# Patient Record
Sex: MALE | Race: WHITE | URBAN - METROPOLITAN AREA
[De-identification: names, ages, dates, MRNs, and addresses within clinical notes are randomized per-mention and may not be internally consistent; named-entity substitution may affect disease eponyms.]

---

## 2023-01-17 ENCOUNTER — HOSPITAL ENCOUNTER (OUTPATIENT)
Dept: PHYSICAL THERAPY | Age: 68
Discharge: HOME | End: 2023-01-17
Payer: COMMERCIAL

## 2023-01-17 PROCEDURE — 97162 PT EVAL MOD COMPLEX 30 MIN: CPT

## 2023-01-17 NOTE — PROGRESS NOTES
PHYSICAL THERAPY - DAILY TREATMENT NOTE    Patient Name: Jeni Dick        Date: 2023  : 1955   YES Patient  Verified  Visit #:      12  Insurance: Payor: Yesi Mom / Plan: [employer] / Product Type: HMO /      In time: 310 Out time: 401   Total Treatment Time: 51     Medicare/BCBS Time Tracking (below)   Total Timed Codes (min): 14 1:1 Treatment Time:  51     TREATMENT AREA =  Right shoulder pain [M25.511]  Left shoulder pain [M25.512]  Neck pain [M54.2]    SUBJECTIVE    Pain Level (on 0 to 10 scale):  0  / 10   Medication Changes/New allergies or changes in medical history, any new surgeries or procedures? NO    If yes, update Summary List   Subjective Functional Status/Changes:  []  No changes reported     See POC          OBJECTIVE  7 min Therapeutic Exercise:  [x]  See flow sheet   Rationale:      increase ROM, increase strength, improve coordination, improve balance, and increase proprioception to improve the patients ability to perform general ADLs with decrease c/o symptoms and with improved functional performance.      7 min Self Care: Reviewed diagnosis, prognosis, therapy progression  Reviewed and educated on HEP   Rationale:    Improve understanding of injury and therapy to have realistic expectation of therapy to improve compliance/adherence and satisfaction    Billed With/As:   [x] TE   [] TA   [] Neuro   [x] Self Care Patient Education: [x] Review HEP    [] Progressed/Changed HEP based on:   [] positioning   [] body mechanics   [] transfers   [] heat/ice application    [] other:        Other Objective/Functional Measures:    Shown and performed HEP     Post Treatment Pain Level (on 0 to 10) scale:   0 / 10     ASSESSMENT  Assessment/Changes in Function:     See POC     []  See Progress Note/Recertification   Patient will continue to benefit from skilled PT services to modify and progress therapeutic interventions, address functional mobility deficits, address ROM deficits, address strength deficits, analyze and address soft tissue restrictions, analyze and cue movement patterns, analyze and modify body mechanics/ergonomics, assess and modify postural abnormalities, and instruct in home and community integration to attain goals per POC.    Progress toward goals / Updated goals:    See POC     PLAN  [x]  Upgrade activities as tolerated YES Continue plan of care   []  Discharge due to :    [x]  Other: 1-2x per week for 6 weeks     Therapist: Mir Stovall PT    Date: 1/17/2023 Time: 8:47 AM     Future Appointments   Date Time Provider Nallely Vickers   1/31/2023 10:30 AM Krissy Hager, PT [facility]   2/2/2023 10:30 AM Krissy Hager, PT [facility]

## 2023-01-17 NOTE — THERAPY EVALUATION
[address] [place] [address] PHYSICAL THERAPY AT [address] [place] [address] [facility] [address] [address] [place] [address], [address]  Phone: [phone]  Fax: [phone] / [address] [address] PHYSICAL THERAPY SERVICES  Patient Name: Gauri Herbert : 1955   Medical   Diagnosis: Right shoulder pain [M25.511]  Left shoulder pain [M25.512]  Neck pain [M54.2] Treatment Diagnosis: Neck and B shoulder pain   Onset Date: Neck pain 4 years   B shoulder 3-4 months      Referral Source: Leavy Cheadle* Start of Care [facility]): 2023   Prior Hospitalization: See medical history Provider #: [id number]   Prior Level of Function: Some limited mobility in the neck due to aching and pain    Comorbidities:  L shoulder 25+ years (non surgical), R ACL reconstruction 30+ years ago. Medications: Verified on Patient Summary List   The Plan of Care and following information is based on the information from the initial evaluation.   ===========================================================================================  Assessment / moralez information:  Gauri Herbert is a 79 y.o. M who presents to skilled PT for the treatment diagnosis of neck and B shoulder pain. Denies any injury to the shoulder. Just years and years of wear and tear. Pt notes some pain with reaching out to the side with R worse than L. The position of the arm can make a difference in terms of the pain, can lift things close by the body with ease, and heavier things arm extended are more painful. Some pain with sleeping on the shoulders, more so on the R shoulder than the L shoulder. Denies injections. Had Xrays for the neck and shoulder on 23 showing nothing sig for the shoulder and some changes for the neck pt also notes some left  finger tingling since the onset of pain. tingling just on the pad of the R index finger. Denies N/T in the R shoulder.      As per the neck can feel a subtle ache in the neck with sometimes provoking a headache. Not a daily occurences. Gets chiropractics to address this. Pain is intermittent. Can go a week without issues in the neck. Had some testosterone shots which helped a lot with the neck pain. Pain:  Current: 0/10    Worst: 7/10 shoulder, 9/10 neck (can come with headaches)   Best: 0/10    ===========================================================================================    Objective:   FOTO score = 59 (an established functional score where 100 = no disability)    Posture: FHRS, slight elevated L clavicle     Functional Activity:  Functional ER - (L) T3   (R) T2 p! Functional IR - (L) T4    (R) T9 p! Shoulder AROM   L flexion 155 deg          R flexion 156 deg   L  deg    R  deg  L Ext 65 deg      R Ext 65 deg     Shoulder PROM   L flexion 155 deg          R flexion 145 deg   L  deg     R  deg   L ER 80 deg     R ER 80 deg   L IR 80 deg    R IR 80 deg     Shoulder RNT   All strong and pain free    Shoulder MMT   All 5/5     Cervical Screen:   Flexion 50 deg   Ext 38 deg tight CT junction   L rot 58 deg   R rot 65 deg   L SB 30 deg   R SB 38 deg     Upper cevical   L nod : DP  R nod: DN    Palpation: no sig TTP along shoulder    (+) empty can R shoulder pain worse in second position   (+) Hawkin's Mason  (+) Neer's test   (-) belly press test  (-) painful arc    Pt signs and symptoms align more with shoulder impingement on the R and cannot rule out possibility of RC tear due to continued night pain. Also notable neck stiffness without sig pain. Pt would benefit form skilled PT services addressing the current ROM, strength, functional performance, and balance impairments so that the patient to return to performing all ADLs with minimal restriction/limitation or without any functional limitations.      HEP provided to the patient in order to promote improvement and self management of symptoms and functional performance:   Access Code: [email or web address]  URL: [email or web address]  Date: 01/17/2023  Prepared by: Marian Vieira    Exercises  Seated Levator Scapulae Stretch - 1-2 x daily - 7 x weekly - 2 reps - 30-60\" hold  Seated Assisted Cervical Rotation with Towel - 1-2 x daily - 7 x weekly - 10 reps  Standing Shoulder Abduction Slides at Wall - 1-2 x daily - 7 x weekly - 10 reps - 5\" hold  Single Arm Doorway Pec Stretch at 90 Degrees Abduction - 1-2 x daily - 7 x weekly - 30-60\" hold      ===========================================================================================  Eval Complexity: History MEDIUM  Complexity : 1-2 comorbidities / personal factors will impact the outcome/ POC ;  Examination  MEDIUM Complexity : 3 Standardized tests and measures addressing body structure, function, activity limitation and / or participation in recreation ; Presentation MEDIUM Complexity : Evolving with changing characteristics ; Decision Making MEDIUM Complexity : FOTO score of 26-74; Overall Complexity MEDIUM  Problem List: pain affecting function, decrease ROM, decrease strength, decrease ADL/ functional abilitiies, decrease activity tolerance, and decrease flexibility/ joint mobility   Treatment Plan may include any combination of the following: Therapeutic exercise, Neuromuscular reeducation, Manual therapy, Therapeutic activity, Self care/home management, Electric stim unattended , Mechanical traction, Electric stim attended, Needle insertion w/o injection (1 or 2 muscles), and Needle insertion w/o injection (3+ muscles)  Patient / Family readiness to learn indicated by: asking questions, trying to perform skills, and interest  Persons(s) to be included in education: patient (P)  Barriers to Learning/Limitations: None  Measures taken, if barriers to learning:    Patient Goal (s): \"Pain relief\"   Patient self reported health status: excellent  Rehabilitation Potential: good  Short Term Goals:  To be accomplished in  4 weeks. 1) Pt will be IND with HEP to facilitate self care management. 2) Pt will improve B shoulder ROM to   Flexion >150 deg   ABD >160 deg   AMMON B T3  FIR  B T5   to order to improve ability to reach in all planes   3) Pt will improve worst pain levels from initial evaluation level of 9/10 to 3/10 to show improved QOL and improved overall perception of pain-free/more pain-free function with ADLs. Long Term Goals: To be accomplished in  6 weeks. 1) Pt will maintain an average pain of 3/10 or less with all activities showing a progression in overall pain levels despite increases in activity. 2) Pt will improve FOTO scores to 69 in order to show detectable change in overall function. 3) Pt will be able to perform reaching overhead with >10 lb with </= 3/10 pain (within tolerable limits) signifying improvement in overall functional capacity and activity tolerance. 4) Pt will improve cervical ROM to B rot >60 deg and B BS >40 deg to order to improve ability to move neck for driving pain free. Frequency / Duration:   Patient to be seen  1-2  times per week for 6  weeks. Patient / Caregiver education and instruction: self care, activity modification, and exercises  Therapist Signature: Marian Vieira PT Date: 1/63/9067   Certification Period: NA Time: 8:46 AM   ===========================================================================================  I certify that the above Physical Therapy Services are being furnished while the patient is under my care. I agree with the treatment plan and certify that this therapy is necessary. Physician Signature:        Date:       Time:                                        Lex Menon*  Please sign and return to [facility] or you may fax the signed copy to [phone]. Thank you.

## 2023-01-31 ENCOUNTER — HOSPITAL ENCOUNTER (OUTPATIENT)
Dept: PHYSICAL THERAPY | Age: 68
Discharge: HOME | End: 2023-01-31
Payer: COMMERCIAL

## 2023-01-31 PROCEDURE — 97140 MANUAL THERAPY 1/> REGIONS: CPT

## 2023-01-31 NOTE — PROGRESS NOTES
PHYSICAL THERAPY - DAILY TREATMENT NOTE    Patient Name: Rudy Eldridge        Date: 2023  : 1955   yes Patient  Verified  Visit #:   2   of   12  Insurance: Payor: Haile Jones / Plan: [employer] / Product Type: HMO /      In time: 1034 Out time: 4958   Total Treatment Time: 31     Medicare/BCBS Time Tracking (below)   Total Timed Codes (min):  NA 1:1 Treatment Time:  NA     TREATMENT AREA =  Right shoulder pain [M25.511]  Left shoulder pain [M25.512]  Neck pain [M54.2]    SUBJECTIVE  Pain Level (on 0 to 10 scale):  2  / 10   Medication Changes/New allergies or changes in medical history, any new surgeries or procedures?    no  If yes, update Summary List   Subjective Functional Status/Changes:  []  No changes reported     Patient reports slight more pain with the exercises. Unsure if he aggrevated it the shoulder          OBJECTIVE  25 min Manual Therapy: Cervical: upper CS mobs for L/R rot, L/R upglides midcervical, DTM SOR, CS paraspinals  Cervical rot stretching L/R  L/R first ribs mobs  R GHJ mobs inf   Rationale:      decrease pain, increase ROM, increase tissue extensibility, decrease edema , and decrease trigger points to improve patient's ability to perform general ADLs with decrease c/o symptoms and with improved functional performance. The manual therapy interventions were performed at a separate and distinct time from the therapeutic activities interventions. 6 min Neuromuscular Re-ed: [x]  See flow sheet  L/R end range re edu for rot     Rationale:    increase ROM, increase strength, improve coordination, improve balance, and increase proprioception to improve the patients ability to perform general ADLs with decrease c/o symptoms and with improved functional performance.     Billed With/As:   [x] TE   [] TA   [] Neuro   [] Self Care Patient Education: [x] Review HEP    [] Progressed/Changed HEP based on:   [] positioning   [] body mechanics   [] transfers   [] heat/ice application    [] other:      Other Objective/Functional Measures:    R shoulder AROM    deg p! Cervical AROM   L rot more limited than R rot        Post Treatment Pain Level (on 0 to 10) scale:   1  / 10     ASSESSMENT  Assessment/Changes in Function:     Patient tolerated today's treatment well. Slight improvement in pain free mobility reaching overhead post manual today. Still more limited with R should mobility  compared to the L and more limited with L cervical rotation compared to the R.      []  See Progress Note/Recertification   Patient will continue to benefit from skilled PT services to modify and progress therapeutic interventions, address functional mobility deficits, address ROM deficits, address strength deficits, analyze and address soft tissue restrictions, analyze and cue movement patterns, analyze and modify body mechanics/ergonomics, assess and modify postural abnormalities, and instruct in home and community integration to attain remaining goals. Progress toward goals / Updated goals:     The is pt first appt since the IE> o sig progress made towards goals yet     PLAN  [x]  Upgrade activities as tolerated yes Continue plan of care   []  Discharge due to :    []  Other:      Therapist: Filemon Cuellar PT    Date: 1/31/2023 Time: 8:52 AM     Future Appointments   Date Time Provider Nallely Vickers   1/31/2023 10:30 AM Nima Ramirez, PT [facility]   2/2/2023 10:30 AM Nima Ramirez, PT [facility]

## 2023-02-02 ENCOUNTER — HOSPITAL ENCOUNTER (OUTPATIENT)
Dept: PHYSICAL THERAPY | Age: 68
Discharge: HOME | End: 2023-02-02
Payer: COMMERCIAL

## 2023-02-02 PROCEDURE — 97140 MANUAL THERAPY 1/> REGIONS: CPT

## 2023-02-02 PROCEDURE — 97535 SELF CARE MNGMENT TRAINING: CPT

## 2023-02-02 NOTE — PROGRESS NOTES
PHYSICAL THERAPY - DAILY TREATMENT NOTE    Patient Name: Emi Russo        Date: 2023  : 1955   yes Patient  Verified  Visit #:   3   of   12  Insurance: Payor: Kenzie Garay / Plan: [address] [place] [address] [place] [employer] / Product Type: HMO /      In time: 1034 Out time: 1106   Total Treatment Time: 32     Medicare/BCBS Time Tracking (below)   Total Timed Codes (min):  NA 1:1 Treatment Time:  NA     TREATMENT AREA =  Right shoulder pain [M25.511]  Left shoulder pain [M25.512]  Neck pain [M54.2]    SUBJECTIVE  Pain Level (on 0 to 10 scale):  2  / 10   Medication Changes/New allergies or changes in medical history, any new surgeries or procedures?    no  If yes, update Summary List   Subjective Functional Status/Changes:  []  No changes reported     Patient reports no sig change from last session. Notes some simple movements           OBJECTIVE  2 min Therapeutic Exercise:  [x]  See flow sheet   Rationale:      increase ROM, increase strength, improve coordination, improve balance, and increase proprioception to improve the patients ability to perform general ADLs with decrease c/o symptoms and with improved functional performance. 20 min Manual Therapy: DTM along posterior cuff   Inf GH  oint mobs in arm 90 deg ABD   Pin of scap with cross body and OH stretching   Rationale:      decrease pain, increase ROM, increase tissue extensibility, decrease edema , and decrease trigger points to improve patient's ability to perform general ADLs with decrease c/o symptoms and with improved functional performance. The manual therapy interventions were performed at a separate and distinct time from the therapeutic activities interventions.       10 min Self Care: Educated on use of massage gun for posterior cuff, and following with posterior cuff stretching and strengthening  Updated HEP  Educate don possible internal impingement pathology    Rationale:    increase ROM, increase strength, improve coordination, improve balance, and increase proprioception to improve the patients ability to perform ADLs with decreased c/o symptoms and improved mobility. Billed With/As:   [x] TE   [] TA   [] Neuro   [x] Self Care Patient Education: [x] Review HEP    [x] Progressed/Changed HEP based on:   Access Code: [email or web address]  URL: [email or web address]  Date: 02/02/2023  Prepared by: Kai Saez    Exercises  Seated Levator Scapulae Stretch - 1-2 x daily - 7 x weekly - 2 reps - 30-60\" hold  Seated Assisted Cervical Rotation with Towel - 1-2 x daily - 7 x weekly - 10 reps  Standing Shoulder Abduction Slides at Wall - 1-2 x daily - 7 x weekly - 10 reps - 5\" hold  Single Arm Doorway Pec Stretch at 90 Degrees Abduction - 1-2 x daily - 7 x weekly - 30-60\" hold  Standing Posterior Rotator Cuff Mobilization - 1 x daily - 7 x weekly - 1-2' hold  Standing Posterior Cuff Stretch - 1 x daily - 7 x weekly - 3 sets - 20-30\" hold  Sidelying Shoulder ER with Towel and Dumbbell - 1 x daily - 7 x weekly - 3 sets - 10 reps    [] positioning   [] body mechanics   [] transfers   [] heat/ice application    [] other:      Other Objective/Functional Measures:    Possible internal impingement due to pain in posterior cuff with pinching going into AMMON position      Post Treatment Pain Level (on 0 to 10) scale:   1  / 10     ASSESSMENT  Assessment/Changes in Function:     Patient tolerated today's treatment well. Pt presents with what seems more like internal impingement due to having pinching with active ER motion in the posterior cuff.  Emailed pt updated HEP with exercises to  address this at home.      []  See Progress Note/Recertification   Patient will continue to benefit from skilled PT services to modify and progress therapeutic interventions, address functional mobility deficits, address ROM deficits, address strength deficits, analyze and address soft tissue restrictions, analyze and cue movement patterns, analyze and modify body mechanics/ergonomics, assess and modify postural abnormalities, and instruct in home and community integration to attain remaining goals. Progress toward goals / Updated goals:    Patient is making fair progress towards their goals at this time. Pain is still present and relatively unchanged but pt reports that he has been over doing it at home which could impact his overall pain levels. . All goals remain applicable at this time.      PLAN  [x]  Upgrade activities as tolerated yes Continue plan of care   []  Discharge due to :    []  Other:      Therapist: Linden Amaya PT    Date: 2/2/2023 Time: 9:47 AM     Future Appointments   Date Time Provider Nallely Vickers   2/2/2023 10:30 AM Tor Fraga, PT [facility]

## 2023-02-06 ENCOUNTER — APPOINTMENT (OUTPATIENT)
Dept: PHYSICAL THERAPY | Age: 68
End: 2023-02-06
Payer: COMMERCIAL

## 2023-02-10 ENCOUNTER — HOSPITAL ENCOUNTER (OUTPATIENT)
Dept: PHYSICAL THERAPY | Age: 68
Discharge: HOME | End: 2023-02-10
Payer: COMMERCIAL

## 2023-02-10 PROCEDURE — 97140 MANUAL THERAPY 1/> REGIONS: CPT

## 2023-02-10 NOTE — PROGRESS NOTES
PHYSICAL THERAPY - DAILY TREATMENT NOTE    Patient Name: Lilibeth Alvarenga        Date: 2/10/2023  : 1955   yes Patient  Verified  Visit #:     Insurance: Payor: [place] Alfred / Plan: [employer] / Product Type: HMO /      In time:  Out time: 1120   Total Treatment Time: 25     Medicare/[facility] Time Tracking (below)   Total Timed Codes (min):  NA 1:1 Treatment Time:  NA     TREATMENT AREA =  Right shoulder pain [M25.511]  Left shoulder pain [M25.512]  Neck pain [M54.2]    SUBJECTIVE  Pain Level (on 0 to 10 scale):  1  / 10   Medication Changes/New allergies or changes in medical history, any new surgeries or procedures?    no  If yes, update Summary List   Subjective Functional Status/Changes:  []  No changes reported     Patient reports the neck was more irritated on Wednesday           OBJECTIVE    min Therapeutic Exercise:  [x]  See flow sheet   Rationale:      increase ROM, increase strength, improve coordination, improve balance, and increase proprioception to improve the patients ability to perform general ADLs with decrease c/o symptoms and with improved functional performance. 25 min Manual Therapy: Dtm along cervical paraspinals focus on R side  Cervical traction   cervical R upglide mid cervical     Shoulder ABD stretching  Tacking pec major and lat and subscap   Rationale:      decrease pain, increase ROM, increase tissue extensibility, decrease edema , and decrease trigger points to improve patient's ability to perform general ADLs with decrease c/o symptoms and with improved functional performance. The manual therapy interventions were performed at a separate and distinct time from the therapeutic activities interventions.      min Therapeutic Activity: [x]  See flow sheet   Rationale:    increase ROM, increase strength, improve coordination, improve balance, and increase proprioception to improve the patients ability to perform general ADLs with decrease c/o symptoms and with improved functional performance. min Neuromuscular Re-ed: [x]  See flow sheet   Rationale:    increase ROM, increase strength, improve coordination, improve balance, and increase proprioception to improve the patients ability to perform general ADLs with decrease c/o symptoms and with improved functional performance. min Gait Training:  ___ feet with ___ device on level surfaces with ___ level of assistance   Rationale: To improve ambulation safety and efficiency in order to improve patient's ability to safely ambulate at home for self care. min Self Care:    Rationale:    increase ROM, increase strength, improve coordination, improve balance, and increase proprioception to improve the patients ability to perform ADLs with decreased c/o symptoms and improved mobility. Billed With/As:   [] TE   [] TA   [] Neuro   [] Self Care Patient Education: [x] Review HEP    [] Progressed/Changed HEP based on:   [] positioning   [] body mechanics   [] transfers   [] heat/ice application    [] other:      Other Objective/Functional Measures:    Unable to perform FS exercises for alternate reasons    Pain located in the top of the shoulder and sometimes in the back      Post Treatment Pain Level (on 0 to 10) scale:   0  / 10     ASSESSMENT  Assessment/Changes in Function:     Patient tolerated today's treatment well. Initially late to tx but able to treat today. Focused more on manual due to time constraints.    Working on STEPAN Deal and dillan, pt reported less pain with shoulder AROM     []  See Progress Note/Recertification   Patient will continue to benefit from skilled PT services to modify and progress therapeutic interventions, address functional mobility deficits, address ROM deficits, address strength deficits, analyze and address soft tissue restrictions, analyze and cue movement patterns, analyze and modify body mechanics/ergonomics, assess and modify postural abnormalities, and instruct in home and community integration to attain remaining goals. Progress toward goals / Updated goals:    Patient is making variable progress towards their goals at this time. No reports of change outside PT but able to make good progress with painless ROM in PT sessions. All goals remain applicable at this time. PLAN  [x]  Upgrade activities as tolerated yes Continue plan of care   []  Discharge due to :    []  Other:      Therapist: Jennifer Sanches, PT    Date: 2/10/2023 Time: 10:57 AM     No future appointments.

## 2023-02-14 ENCOUNTER — HOSPITAL ENCOUNTER (OUTPATIENT)
Facility: HOSPITAL | Age: 68
Setting detail: RECURRING SERIES
Discharge: HOME | End: 2023-02-17
Payer: COMMERCIAL

## 2023-02-14 PROCEDURE — 97140 MANUAL THERAPY 1/> REGIONS: CPT

## 2023-02-14 PROCEDURE — 97110 THERAPEUTIC EXERCISES: CPT

## 2023-02-14 PROCEDURE — 97112 NEUROMUSCULAR REEDUCATION: CPT

## 2023-02-14 NOTE — PROGRESS NOTES
PHYSICAL / OCCUPATIONAL THERAPY - DAILY TREATMENT NOTE (updated )    Patient Name: Nyla Koenig    Date: 2023    : 1955  Insurance: Payor: /      Patient  verified Yes     Visit #   Current / Total 5 12   Time   In / Out 1100 1138   Pain   In / Out 2/10 0/10   Subjective Functional Status/Changes: The shoulder still bites him randomly. Was tossing a small tape measure and then then shoulder really acted up. Changes to:  Meds, Allergies, Med Hx, Sx Hx? If yes, update Summary List no     TREATMENT AREA =  Pain in right shoulder [M25.511]  Pain in left shoulder [M25.512]    OBJECTIVE      Therapeutic Procedures: Tx Min Billable or 1:1 Min (if diff from Tx Min) Procedure, Rationale, Specifics   15  71313 Manual Therapy (timed):  decrease pain, increase ROM, increase tissue extensibility, and decrease trigger points to improve patient's ability to progress to PLOF and address remaining functional goals. The manual therapy interventions were performed at a separate and distinct time from the therapeutic activities interventions . (see flow sheet as applicable)     Details if applicable:    DTM along posterior cuff and supraspinatus  Inf GH joint mobs in arm 90 deg ABD   Shoulder stretching ABD       13  21307 Therapeutic Exercise (timed):  increase ROM, strength, coordination, balance, and proprioception to improve patient's ability to progress to PLOF and address remaining functional goals. (see flow sheet as applicable)     Details if applicable:     10  [id number] Neuromuscular Re-Education (timed):  improve balance, coordination, kinesthetic sense, posture, core stability and proprioception to improve patient's ability to develop conscious control of individual muscles and awareness of position of extremities in order to progress to PLOF and address remaining functional goals.  (see flow sheet as applicable)     Details if applicable:            Details if applicable:            Details if applicable:     45  [facility] Totals Reminder: bill using total billable min of TIMED therapeutic procedures (example: do not include dry needle or estim unattended, both untimed codes, in totals to left)  8-22 min = 1 unit; 23-37 min = 2 units; 38-52 min = 3 units; 53-67 min = 4 units; 68-82 min = 5 units   Total Total     [x]  Patient Education billed concurrently with other procedures   [x] Review HEP    [] Progressed/Changed HEP, detail:    [] Other detail:       Objective Information/Functional Measures/Assessment    Pt shows decreased R shoulder ABD without any pain. (+) empty can test and slight pain with shoulder flexion MMT. Pain seems more RC in nature based on pain with excessive reaching and quicker motions. Tolerated all manual well today. Initiated shoulder ER/IR. Fatigued with shoulder ER. Due to minimal progress and minimal carryover of progress, recommend f/u with doctor and possible MRI to rule out [facility] pathology. Patient will continue to benefit from skilled PT / OT services to modify and progress therapeutic interventions, analyze and address functional mobility deficits, analyze and address ROM deficits, analyze and address strength deficits, analyze and address soft tissue restrictions, analyze and cue for proper movement patterns, and analyze and modify for postural abnormalities to address functional deficits and attain remaining goals. Progress toward goals / Updated goals:  []  See Progress Note/Recertification    Making good progress in terms of mobility in the clinic but still making minimal progress to STG/LTG.     PLAN  Yes  Continue plan of care  []  Upgrade activities as tolerated  []  Discharge due to :  []  Other:    Reagan Stevenson PT    2/14/2023    10:58 AM    Future Appointments   Date Time Provider Eula Shah   2/14/2023 11:00 AM Reagan Stevenson PT [facility]   2/16/2023  3:00 PM [facility]

## 2023-02-16 ENCOUNTER — APPOINTMENT (OUTPATIENT)
Facility: HOSPITAL | Age: 68
End: 2023-02-16
Payer: COMMERCIAL